# Patient Record
Sex: MALE | Race: BLACK OR AFRICAN AMERICAN | NOT HISPANIC OR LATINO
[De-identification: names, ages, dates, MRNs, and addresses within clinical notes are randomized per-mention and may not be internally consistent; named-entity substitution may affect disease eponyms.]

---

## 2024-02-06 ENCOUNTER — NON-APPOINTMENT (OUTPATIENT)
Age: 49
End: 2024-02-06

## 2024-02-08 ENCOUNTER — APPOINTMENT (OUTPATIENT)
Dept: ORTHOPEDIC SURGERY | Facility: CLINIC | Age: 49
End: 2024-02-08
Payer: OTHER MISCELLANEOUS

## 2024-02-08 VITALS — BODY MASS INDEX: 25.9 KG/M2 | HEIGHT: 67 IN | WEIGHT: 165 LBS

## 2024-02-08 DIAGNOSIS — Z78.9 OTHER SPECIFIED HEALTH STATUS: ICD-10-CM

## 2024-02-08 PROBLEM — Z00.00 ENCOUNTER FOR PREVENTIVE HEALTH EXAMINATION: Status: ACTIVE | Noted: 2024-02-08

## 2024-02-08 PROCEDURE — 99204 OFFICE O/P NEW MOD 45 MIN: CPT

## 2024-02-08 PROCEDURE — 72110 X-RAY EXAM L-2 SPINE 4/>VWS: CPT

## 2024-02-08 PROCEDURE — 72170 X-RAY EXAM OF PELVIS: CPT

## 2024-02-08 RX ORDER — NAPROXEN 500 MG/1
500 TABLET ORAL
Qty: 60 | Refills: 0 | Status: ACTIVE | COMMUNITY
Start: 2024-02-08 | End: 1900-01-01

## 2024-02-08 RX ORDER — GABAPENTIN 100 MG/1
100 CAPSULE ORAL
Qty: 60 | Refills: 2 | Status: ACTIVE | COMMUNITY
Start: 2024-02-08 | End: 1900-01-01

## 2024-02-08 NOTE — ASSESSMENT
[FreeTextEntry1] : MRI L spine to eval HNP f/u after MRI PT, meds Gabapentin- Patient advised of sedating effects, instructed not to drive, operate machinery, or take with other sedating medications. Advised of need to taper on/off medication and risk of abruptly stopping gabapentin. NSAIDs- Patient warned of risk of medication to GI tract, increased blood pressure, cardiac risk, and risk of fluid retention.  Advised to clear medication with internist or PCP if any concurrent health problem with heart, blood pressure, or GI system exists. RTW light duty. No lifting >5 lbs. Patient seen by Yadi Thomas PA-C, with and under the supervision of  Dr. Davion Jenkins M.D.

## 2024-02-08 NOTE — RETURN TO WORK/SCHOOL
[FreeTextEntry1] : Based on my current evaluation, patient may return to work light duty on 2/12/24. No lifting >5 lbs. Will reassess at patient's MRI follow up appointment.

## 2024-02-08 NOTE — IMAGING
[Straightening consistent with spasm] : Straightening consistent with spasm [Disc space narrowing] : Disc space narrowing [AP] : anteroposterior [There are no fractures, subluxations or dislocations. No significant abnormalities are seen] : There are no fractures, subluxations or dislocations. No significant abnormalities are seen [de-identified] : LSPINE Inspection: No rash or ecchymosis Palpation: midline lumbar tenderness, L>R lumbar paraspinal muscle tenderness ROM: diminished all planes Strength: 4+/5 bilateral hip flexors, knee extensors. 4/5 bilateral ankle dorsiflexors, EHL, ankle plantarflexors. Testing somewhat limited by pain/effort.  Sensation: Sensation present to light touch bilateral L2-S1 distributions Provocative maneuvers: positive bilateral straight leg raise [FreeTextEntry1] : L4-S1 DDD [de-identified] : mild bilateral hip OA

## 2024-02-08 NOTE — WORK
[Sprain/Strain] : sprain/strain [Was the competent medical cause of the injury] : was the competent medical cause of the injury [Are consistent with the injury] : are consistent with the injury [Consistent with my objective findings] : consistent with my objective findings [Reveals pre-existing condition(s) that may affect treatment/prognosis] : reveals pre-existing condition(s) that may affect treatment/prognosis [Partial] : partial [Can return to work with limitations on ______] : can return to work with limitations on [unfilled] [Lifting] : lifting [Unknown at this time] : : unknown at this time [Patient] : patient [Rx may affect patient's ability to return to work, make patient drowsy, or other issue] : Rx may affect patient's ability to return to work, make patient drowsy, or other issue. [I provided the services listed above] :  I provided the services listed above. [FreeTextEntry1] : fair [FreeTextEntry2] : DDD

## 2024-02-08 NOTE — HISTORY OF PRESENT ILLNESS
[Lower back] : lower back [Work related] : work related [10] : 10 [8] : 8 [Radiating] : radiating [Sharp] : sharp [Shooting] : shooting [Tingling] : tingling [Constant] : constant [Work] : work [Meds] : meds [Ice] : ice [Heat] : heat [Sitting] : sitting [Not working due to injury] : Work status: not working due to injury [de-identified] : WC DOI 02/05/2024 Occupation:   02/08/2024:  49 y/o M presents for lower back pain since  02/05/2024. Pt states that he lifted his 75-80lb toolbox and felt slight discomfort in his lower back. As the day progressed, he was using a 75lb machine to tread pipe and the machine lifted. He then reached up to control it and felt a pull in his lower back. By the time he drove home, he was unable to get out of the car. By the next morning, pt states that he couldn't lift or bend his legs. Currently reports radiation down bilateral posterior thighs, as well as numbness in bilateral feet. Aggravated by prolonged sitting/standing/walking. Seen by outside urgent care yesterday and RXed cyclobenzaprine and lidocaine patches/given TPI, which has provided partial relief. Denies b/b incontinence. Denies prior lower back PT/surgeries. Has been OOW since injury.  PMH: denies [] : no [FreeTextEntry3] : 02/05/2024 [FreeTextEntry6] : numbness  [FreeTextEntry7] : down both legs  [de-identified] :

## 2024-02-14 ENCOUNTER — APPOINTMENT (OUTPATIENT)
Dept: MRI IMAGING | Facility: CLINIC | Age: 49
End: 2024-02-14

## 2024-03-07 ENCOUNTER — APPOINTMENT (OUTPATIENT)
Dept: ORTHOPEDIC SURGERY | Facility: CLINIC | Age: 49
End: 2024-03-07

## 2024-03-22 ENCOUNTER — APPOINTMENT (OUTPATIENT)
Dept: MRI IMAGING | Facility: CLINIC | Age: 49
End: 2024-03-22
Payer: OTHER MISCELLANEOUS

## 2024-03-22 PROCEDURE — 72148 MRI LUMBAR SPINE W/O DYE: CPT

## 2024-04-18 ENCOUNTER — APPOINTMENT (OUTPATIENT)
Dept: ORTHOPEDIC SURGERY | Facility: CLINIC | Age: 49
End: 2024-04-18
Payer: OTHER MISCELLANEOUS

## 2024-04-18 DIAGNOSIS — M51.9 UNSPECIFIED THORACIC, THORACOLUMBAR AND LUMBOSACRAL INTERVERTEBRAL DISC DISORDER: ICD-10-CM

## 2024-04-18 DIAGNOSIS — S39.012A STRAIN OF MUSCLE, FASCIA AND TENDON OF LOWER BACK, INITIAL ENCOUNTER: ICD-10-CM

## 2024-04-18 DIAGNOSIS — M54.16 RADICULOPATHY, LUMBAR REGION: ICD-10-CM

## 2024-04-18 PROCEDURE — 99215 OFFICE O/P EST HI 40 MIN: CPT

## 2024-04-18 NOTE — WORK
[Sprain/Strain] : sprain/strain [Was the competent medical cause of the injury] : was the competent medical cause of the injury [Are consistent with the injury] : are consistent with the injury [Consistent with my objective findings] : consistent with my objective findings [Reveals pre-existing condition(s) that may affect treatment/prognosis] : reveals pre-existing condition(s) that may affect treatment/prognosis [Can return to work with limitations on ______] : can return to work with limitations on [unfilled] [Lifting] : lifting [Unknown at this time] : : unknown at this time [Patient] : patient [Rx may affect patient's ability to return to work, make patient drowsy, or other issue] : Rx may affect patient's ability to return to work, make patient drowsy, or other issue. [I provided the services listed above] :  I provided the services listed above. [Mild Partial] : mild partial [FreeTextEntry1] : fair [FreeTextEntry2] : DDD

## 2024-04-18 NOTE — HISTORY OF PRESENT ILLNESS
[Lower back] : lower back [Work related] : work related [10] : 10 [8] : 8 [Radiating] : radiating [Sharp] : sharp [Shooting] : shooting [Tingling] : tingling [Constant] : constant [Work] : work [Meds] : meds [Ice] : ice [Heat] : heat [Sitting] : sitting [Not working due to injury] : Work status: not working due to injury [de-identified] : WC DOI 02/05/2024 Occupation:   3/22/24 Lumbar MRI  - report noted in chart.  Ind. review- L4/5 bulge with LR narrowing and NF narrowing; L5/S1 bulge with effacement of ventral thecal sac ================================================================= 02/08/2024:  49 y/o M presents for lower back pain since  02/05/2024. Pt states that he lifted his 75-80lb toolbox and felt slight discomfort in his lower back. As the day progressed, he was using a 75lb machine to tread pipe and the machine lifted. He then reached up to control it and felt a pull in his lower back. By the time he drove home, he was unable to get out of the car. By the next morning, pt states that he couldn't lift or bend his legs. Currently reports radiation down bilateral posterior thighs, as well as numbness in bilateral feet. Aggravated by prolonged sitting/standing/walking. Seen by outside urgent care yesterday and RXed cyclobenzaprine and lidocaine patches/given TPI, which has provided partial relief. Denies b/b incontinence. Denies prior lower back PT/surgeries. Has been OOW since injury.  PMH: denies  04/18/2024: Patient is here today to discuss MRI results of L-Spine. He reports that slight improvement since the last visit, he is taking Gabapentin and Naproxen, along with stretching as needed for relief. Pt also states that he has been experiencing numbness and tingling in his feet and it has worsened.  [] : no [FreeTextEntry3] : 02/05/2024 [FreeTextEntry6] : numbness  [FreeTextEntry7] : down both legs  [de-identified] :

## 2024-04-18 NOTE — IMAGING
[Straightening consistent with spasm] : Straightening consistent with spasm [Disc space narrowing] : Disc space narrowing [AP] : anteroposterior [There are no fractures, subluxations or dislocations. No significant abnormalities are seen] : There are no fractures, subluxations or dislocations. No significant abnormalities are seen [de-identified] : LSPINE Palpation: midline lumbar tenderness, L>R lumbar paraspinal muscle tenderness ROM: diminished all planes Strength: 5/5  Sensation: Sensation present to light touch bilateral L2-S1 distributions Provocative maneuvers: positive bilateral straight leg raise [FreeTextEntry1] : L4-S1 DDD [de-identified] : mild bilateral hip OA

## 2024-04-18 NOTE — ASSESSMENT
[FreeTextEntry1] : L4/5 bulge with LR narrowing and NF narrowing; L5/S1 bulge with effacement of ventral thecal sac Discussed indications for surgery   Gabapentin- Patient advised of sedating effects, instructed not to drive, operate machinery, or take with other sedating medications. Advised of need to taper on/off medication and risk of abruptly stopping gabapentin.  NSAIDs- Patient warned of risk of medication to GI tract, increased blood pressure, cardiac risk, and risk of fluid retention.  Advised to clear medication with internist or PCP if any concurrent health problem with heart, blood pressure, or GI system exists. RTW light duty. No lifting >5 lbs.

## 2024-07-18 ENCOUNTER — APPOINTMENT (OUTPATIENT)
Dept: ORTHOPEDIC SURGERY | Facility: CLINIC | Age: 49
End: 2024-07-18

## 2024-07-25 ENCOUNTER — APPOINTMENT (OUTPATIENT)
Dept: ORTHOPEDIC SURGERY | Facility: CLINIC | Age: 49
End: 2024-07-25
Payer: OTHER MISCELLANEOUS

## 2024-07-25 DIAGNOSIS — M51.9 UNSPECIFIED THORACIC, THORACOLUMBAR AND LUMBOSACRAL INTERVERTEBRAL DISC DISORDER: ICD-10-CM

## 2024-07-25 DIAGNOSIS — S39.012A STRAIN OF MUSCLE, FASCIA AND TENDON OF LOWER BACK, INITIAL ENCOUNTER: ICD-10-CM

## 2024-07-25 DIAGNOSIS — M54.16 RADICULOPATHY, LUMBAR REGION: ICD-10-CM

## 2024-07-25 PROCEDURE — 99214 OFFICE O/P EST MOD 30 MIN: CPT

## 2024-07-25 NOTE — HISTORY OF PRESENT ILLNESS
[Lower back] : lower back [Work related] : work related [10] : 10 [8] : 8 [Radiating] : radiating [Sharp] : sharp [Shooting] : shooting [Tingling] : tingling [Constant] : constant [Work] : work [Meds] : meds [Ice] : ice [Heat] : heat [Sitting] : sitting [Not working due to injury] : Work status: not working due to injury [de-identified] : WC DOI 02/05/2024 Occupation:   3/22/24 Lumbar MRI  - report noted in chart.  Ind. review- L4/5 bulge with LR narrowing and NF narrowing; L5/S1 bulge with effacement of ventral thecal sac =================================================================================================================================;02/08/2024:  49 y/o M presents for lower back pain since  02/05/2024. Pt states that he lifted his 75-80lb toolbox and felt slight discomfort in his lower back. As the day progressed, he was using a 75lb machine to tread pipe and the machine lifted. He then reached up to control it and felt a pull in his lower back. By the time he drove home, he was unable to get out of the car. By the next morning, pt states that he couldn't lift or bend his legs. Currently reports radiation down bilateral posterior thighs, as well as numbness in bilateral feet. Aggravated by prolonged sitting/standing/walking. Seen by outside urgent care yesterday and RXed cyclobenzaprine and lidocaine patches/given TPI, which has provided partial relief. Denies b/b incontinence. Denies prior lower back PT/surgeries. Has been OOW since injury.  PMH: denies  04/18/2024: Patient is here today to discuss MRI results of L-Spine. He reports that slight improvement since the last visit, he is taking Gabapentin and Naproxen, along with stretching as needed for relief. Pt also states that he has been experiencing numbness and tingling in his feet and it has worsened.   7/25/24: Patient is here to follow up on L Spine. states that he still has occasional pain. has been doing HEP. Reports PT not approved.  [] : no [FreeTextEntry6] : numbness  [FreeTextEntry7] : down both legs  [de-identified] :

## 2024-07-25 NOTE — WORK
[Sprain/Strain] : sprain/strain [Was the competent medical cause of the injury] : was the competent medical cause of the injury [Are consistent with the injury] : are consistent with the injury [Consistent with my objective findings] : consistent with my objective findings [Mild Partial] : mild partial [Reveals pre-existing condition(s) that may affect treatment/prognosis] : reveals pre-existing condition(s) that may affect treatment/prognosis [Can return to work with limitations on ______] : can return to work with limitations on [unfilled] [Lifting] : lifting [Unknown at this time] : : unknown at this time [Patient] : patient [Rx may affect patient's ability to return to work, make patient drowsy, or other issue] : Rx may affect patient's ability to return to work, make patient drowsy, or other issue. [I provided the services listed above] :  I provided the services listed above. [FreeTextEntry1] : fair [FreeTextEntry2] : DDD

## 2024-07-25 NOTE — ASSESSMENT
[FreeTextEntry1] : L4/5 bulge with LR narrowing and NF narrowing; L5/S1 bulge with effacement of ventral thecal sac  Gabapentin- Patient advised of sedating effects, instructed not to drive, operate machinery, or take with other sedating medications. Advised of need to taper on/off medication and risk of abruptly stopping gabapentin.  NSAIDs- Patient warned of risk of medication to GI tract, increased blood pressure, cardiac risk, and risk of fluid retention.  Advised to clear medication with internist or PCP if any concurrent health problem with heart, blood pressure, or GI system exists. RTW light duty. No lifting >5 lbs.

## 2024-07-25 NOTE — IMAGING
[Straightening consistent with spasm] : Straightening consistent with spasm [Disc space narrowing] : Disc space narrowing [AP] : anteroposterior [There are no fractures, subluxations or dislocations. No significant abnormalities are seen] : There are no fractures, subluxations or dislocations. No significant abnormalities are seen [de-identified] : LSPINE Palpation: midline lumbar tenderness, L>R lumbar paraspinal muscle tenderness ROM: diminished all planes Strength: 5/5  Sensation: Sensation present to light touch bilateral L2-S1 distributions Provocative maneuvers: positive bilateral straight leg raise [FreeTextEntry1] : L4-S1 DDD [de-identified] : mild bilateral hip OA

## 2024-10-17 ENCOUNTER — APPOINTMENT (OUTPATIENT)
Dept: ORTHOPEDIC SURGERY | Facility: CLINIC | Age: 49
End: 2024-10-17

## 2025-01-02 ENCOUNTER — NON-APPOINTMENT (OUTPATIENT)
Age: 50
End: 2025-01-02